# Patient Record
Sex: FEMALE | Race: WHITE | NOT HISPANIC OR LATINO
[De-identification: names, ages, dates, MRNs, and addresses within clinical notes are randomized per-mention and may not be internally consistent; named-entity substitution may affect disease eponyms.]

---

## 2018-07-15 PROBLEM — Z00.00 ENCOUNTER FOR PREVENTIVE HEALTH EXAMINATION: Status: ACTIVE | Noted: 2018-07-15

## 2018-08-14 ENCOUNTER — APPOINTMENT (OUTPATIENT)
Dept: OBGYN | Facility: CLINIC | Age: 58
End: 2018-08-14
Payer: COMMERCIAL

## 2018-08-14 VITALS
HEIGHT: 64 IN | HEART RATE: 73 BPM | DIASTOLIC BLOOD PRESSURE: 96 MMHG | WEIGHT: 159 LBS | BODY MASS INDEX: 27.14 KG/M2 | SYSTOLIC BLOOD PRESSURE: 165 MMHG

## 2018-08-14 DIAGNOSIS — Z82.49 FAMILY HISTORY OF ISCHEMIC HEART DISEASE AND OTHER DISEASES OF THE CIRCULATORY SYSTEM: ICD-10-CM

## 2018-08-14 DIAGNOSIS — Z80.3 FAMILY HISTORY OF MALIGNANT NEOPLASM OF BREAST: ICD-10-CM

## 2018-08-14 DIAGNOSIS — Z84.1 FAMILY HISTORY OF DISORDERS OF KIDNEY AND URETER: ICD-10-CM

## 2018-08-14 DIAGNOSIS — Z78.9 OTHER SPECIFIED HEALTH STATUS: ICD-10-CM

## 2018-08-14 DIAGNOSIS — Z86.2 PERSONAL HISTORY OF DISEASES OF THE BLOOD AND BLOOD-FORMING ORGANS AND CERTAIN DISORDERS INVOLVING THE IMMUNE MECHANISM: ICD-10-CM

## 2018-08-14 DIAGNOSIS — Z83.3 FAMILY HISTORY OF DIABETES MELLITUS: ICD-10-CM

## 2018-08-14 PROCEDURE — 99205 OFFICE O/P NEW HI 60 MIN: CPT

## 2018-08-14 RX ORDER — METRONIDAZOLE 7.5 MG/G
0.75 GEL VAGINAL
Qty: 1 | Refills: 1 | Status: ACTIVE | COMMUNITY
Start: 2018-08-14 | End: 1900-01-01

## 2018-09-07 ENCOUNTER — OUTPATIENT (OUTPATIENT)
Dept: OUTPATIENT SERVICES | Facility: HOSPITAL | Age: 58
LOS: 1 days | End: 2018-09-07
Payer: COMMERCIAL

## 2018-09-07 VITALS
RESPIRATION RATE: 16 BRPM | TEMPERATURE: 98 F | HEIGHT: 64 IN | HEART RATE: 78 BPM | DIASTOLIC BLOOD PRESSURE: 72 MMHG | SYSTOLIC BLOOD PRESSURE: 136 MMHG | OXYGEN SATURATION: 97 % | WEIGHT: 158.95 LBS

## 2018-09-07 DIAGNOSIS — N85.2 HYPERTROPHY OF UTERUS: ICD-10-CM

## 2018-09-07 DIAGNOSIS — D25.9 LEIOMYOMA OF UTERUS, UNSPECIFIED: ICD-10-CM

## 2018-09-07 DIAGNOSIS — D50.0 IRON DEFICIENCY ANEMIA SECONDARY TO BLOOD LOSS (CHRONIC): ICD-10-CM

## 2018-09-07 DIAGNOSIS — Z01.818 ENCOUNTER FOR OTHER PREPROCEDURAL EXAMINATION: ICD-10-CM

## 2018-09-07 DIAGNOSIS — N92.0 EXCESSIVE AND FREQUENT MENSTRUATION WITH REGULAR CYCLE: ICD-10-CM

## 2018-09-07 DIAGNOSIS — D64.9 ANEMIA, UNSPECIFIED: ICD-10-CM

## 2018-09-07 LAB
ANION GAP SERPL CALC-SCNC: 13 MMOL/L — SIGNIFICANT CHANGE UP (ref 5–17)
BLD GP AB SCN SERPL QL: NEGATIVE — SIGNIFICANT CHANGE UP
BUN SERPL-MCNC: 13 MG/DL — SIGNIFICANT CHANGE UP (ref 7–23)
CALCIUM SERPL-MCNC: 9.9 MG/DL — SIGNIFICANT CHANGE UP (ref 8.4–10.5)
CHLORIDE SERPL-SCNC: 100 MMOL/L — SIGNIFICANT CHANGE UP (ref 96–108)
CO2 SERPL-SCNC: 24 MMOL/L — SIGNIFICANT CHANGE UP (ref 22–31)
CREAT SERPL-MCNC: 0.73 MG/DL — SIGNIFICANT CHANGE UP (ref 0.5–1.3)
GLUCOSE SERPL-MCNC: 83 MG/DL — SIGNIFICANT CHANGE UP (ref 70–99)
POTASSIUM SERPL-MCNC: 4.3 MMOL/L — SIGNIFICANT CHANGE UP (ref 3.5–5.3)
POTASSIUM SERPL-SCNC: 4.3 MMOL/L — SIGNIFICANT CHANGE UP (ref 3.5–5.3)
RH IG SCN BLD-IMP: POSITIVE — SIGNIFICANT CHANGE UP
SODIUM SERPL-SCNC: 137 MMOL/L — SIGNIFICANT CHANGE UP (ref 135–145)

## 2018-09-07 PROCEDURE — 86901 BLOOD TYPING SEROLOGIC RH(D): CPT

## 2018-09-07 PROCEDURE — G0463: CPT

## 2018-09-07 PROCEDURE — 86850 RBC ANTIBODY SCREEN: CPT

## 2018-09-07 PROCEDURE — 80048 BASIC METABOLIC PNL TOTAL CA: CPT

## 2018-09-07 PROCEDURE — 86900 BLOOD TYPING SEROLOGIC ABO: CPT

## 2018-09-07 PROCEDURE — 83036 HEMOGLOBIN GLYCOSYLATED A1C: CPT

## 2018-09-07 PROCEDURE — 85027 COMPLETE CBC AUTOMATED: CPT

## 2018-09-07 RX ORDER — CEFOTETAN DISODIUM 1 G
2 VIAL (EA) INJECTION ONCE
Qty: 0 | Refills: 0 | Status: DISCONTINUED | OUTPATIENT
Start: 2018-09-14 | End: 2018-09-16

## 2018-09-07 NOTE — H&P PST ADULT - HISTORY OF PRESENT ILLNESS
57 year old female  with anemia, &  h/o uterine fibroid for 5-6 years, now reports having  "large uterine fibroid" , scheduled for surgery on  09/14/14. 57 year old female  with PMH of anemia, &  uterine fibroid for 5-6 years, now reports having lower abdominal fulness &  heavy periods due to "large uterine fibroid" , scheduled for surgery on  09/14/14.

## 2018-09-07 NOTE — H&P PST ADULT - ASSESSMENT
CAPRINI SCORE [CLOT]    AGE RELATED RISK FACTORS                                                       MOBILITY RELATED FACTORS  [ x] Age 41-60 years                                            (1 Point)                  [ ] Bed rest                                                        (1 Point)  [ ] Age: 61-74 years                                           (2 Points)                 [ ] Plaster cast                                                   (2 Points)  [ ] Age= 75 years                                              (3 Points)                 [ ] Bed bound for more than 72 hours                 (2 Points)    DISEASE RELATED RISK FACTORS                                               GENDER SPECIFIC FACTORS  [ ] Edema in the lower extremities                       (1 Point)                  [ ] Pregnancy                                                     (1 Point)  [ ] Varicose veins                                               (1 Point)                  [ ] Post-partum < 6 weeks                                   (1 Point)             [ x] BMI > 25 Kg/m2                                            (1 Point)                  [ ] Hormonal therapy  or oral contraception          (1 Point)                 [ ] Sepsis (in the previous month)                        (1 Point)                  [ ] History of pregnancy complications                 (1 point)  [ ] Pneumonia or serious lung disease                                               [ ] Unexplained or recurrent                     (1 Point)           (in the previous month)                               (1 Point)  [ ] Abnormal pulmonary function test                     (1 Point)                 SURGERY RELATED RISK FACTORS  [ ] Acute myocardial infarction                              (1 Point)                 [ ]  Section                                             (1 Point)  [ ] Congestive heart failure (in the previous month)  (1 Point)               [ ] Minor surgery                                                  (1 Point)   [ ] Inflammatory bowel disease                             (1 Point)                 [ ] Arthroscopic surgery                                        (2 Points)  [ ] Central venous access                                      (2 Points)                [x ] General surgery lasting more than 45 minutes   (2 Points)       [ ] Stroke (in the previous month)                          (5 Points)               [ ] Elective arthroplasty                                         (5 Points)                                                                                                                                               HEMATOLOGY RELATED FACTORS                                                 TRAUMA RELATED RISK FACTORS  [ ] Prior episodes of VTE                                     (3 Points)                 [ ] Fracture of the hip, pelvis, or leg                       (5 Points)  [ ] Positive family history for VTE                         (3 Points)                 [ ] Acute spinal cord injury (in the previous month)  (5 Points)  [ ] Prothrombin 87653 A                                     (3 Points)                 [ ] Paralysis  (less than 1 month)                             (5 Points)  [ ] Factor V Leiden                                             (3 Points)                  [ ] Multiple Trauma within 1 month                        (5 Points)  [ ] Lupus anticoagulants                                     (3 Points)                                                           [ ] Anticardiolipin antibodies                               (3 Points)                                                       [ ] High homocysteine in the blood                      (3 Points)                                             [ ] Other congenital or acquired thrombophilia      (3 Points)                                                [ ] Heparin induced thrombocytopenia                  (3 Points)                                          Total Score [ 4 ]

## 2018-09-08 LAB
HBA1C BLD-MCNC: 5.6 % — SIGNIFICANT CHANGE UP (ref 4–5.6)
HCT VFR BLD CALC: 50.5 % — HIGH (ref 34.5–45)
HGB BLD-MCNC: 17.1 G/DL — HIGH (ref 11.5–15.5)
MCHC RBC-ENTMCNC: 32 PG — SIGNIFICANT CHANGE UP (ref 27–34)
MCHC RBC-ENTMCNC: 33.9 GM/DL — SIGNIFICANT CHANGE UP (ref 32–36)
MCV RBC AUTO: 94.4 FL — SIGNIFICANT CHANGE UP (ref 80–100)
PLATELET # BLD AUTO: 241 K/UL — SIGNIFICANT CHANGE UP (ref 150–400)
RBC # BLD: 5.35 M/UL — HIGH (ref 3.8–5.2)
RBC # FLD: 13.7 % — SIGNIFICANT CHANGE UP (ref 10.3–14.5)
WBC # BLD: 7.34 K/UL — SIGNIFICANT CHANGE UP (ref 3.8–10.5)
WBC # FLD AUTO: 7.34 K/UL — SIGNIFICANT CHANGE UP (ref 3.8–10.5)

## 2018-09-13 ENCOUNTER — TRANSCRIPTION ENCOUNTER (OUTPATIENT)
Age: 58
End: 2018-09-13

## 2018-09-14 ENCOUNTER — TRANSCRIPTION ENCOUNTER (OUTPATIENT)
Age: 58
End: 2018-09-14

## 2018-09-14 ENCOUNTER — RESULT REVIEW (OUTPATIENT)
Age: 58
End: 2018-09-14

## 2018-09-14 ENCOUNTER — APPOINTMENT (OUTPATIENT)
Dept: OBGYN | Facility: HOSPITAL | Age: 58
End: 2018-09-14

## 2018-09-14 ENCOUNTER — INPATIENT (INPATIENT)
Facility: HOSPITAL | Age: 58
LOS: 1 days | Discharge: HOME | DRG: 743 | End: 2018-09-16
Attending: OBSTETRICS & GYNECOLOGY | Admitting: OBSTETRICS & GYNECOLOGY
Payer: COMMERCIAL

## 2018-09-14 VITALS
HEART RATE: 79 BPM | TEMPERATURE: 99 F | RESPIRATION RATE: 20 BRPM | OXYGEN SATURATION: 98 % | SYSTOLIC BLOOD PRESSURE: 144 MMHG | HEIGHT: 64 IN | DIASTOLIC BLOOD PRESSURE: 81 MMHG | WEIGHT: 158.95 LBS

## 2018-09-14 DIAGNOSIS — D50.0 IRON DEFICIENCY ANEMIA SECONDARY TO BLOOD LOSS (CHRONIC): ICD-10-CM

## 2018-09-14 DIAGNOSIS — D25.9 LEIOMYOMA OF UTERUS, UNSPECIFIED: ICD-10-CM

## 2018-09-14 LAB
BASOPHILS # BLD AUTO: 0 K/UL — SIGNIFICANT CHANGE UP (ref 0–0.2)
BASOPHILS NFR BLD AUTO: 0.1 % — SIGNIFICANT CHANGE UP (ref 0–2)
EOSINOPHIL # BLD AUTO: 0 K/UL — SIGNIFICANT CHANGE UP (ref 0–0.5)
EOSINOPHIL NFR BLD AUTO: 0.3 % — SIGNIFICANT CHANGE UP (ref 0–6)
GLUCOSE BLDC GLUCOMTR-MCNC: 84 MG/DL — SIGNIFICANT CHANGE UP (ref 70–99)
HCG UR QL: NEGATIVE — SIGNIFICANT CHANGE UP
HCT VFR BLD CALC: 46 % — HIGH (ref 34.5–45)
HGB BLD-MCNC: 15.3 G/DL — SIGNIFICANT CHANGE UP (ref 11.5–15.5)
LYMPHOCYTES # BLD AUTO: 1.2 K/UL — SIGNIFICANT CHANGE UP (ref 1–3.3)
LYMPHOCYTES # BLD AUTO: 7 % — LOW (ref 13–44)
MCHC RBC-ENTMCNC: 31.2 PG — SIGNIFICANT CHANGE UP (ref 27–34)
MCHC RBC-ENTMCNC: 33.3 GM/DL — SIGNIFICANT CHANGE UP (ref 32–36)
MCV RBC AUTO: 93.6 FL — SIGNIFICANT CHANGE UP (ref 80–100)
MONOCYTES # BLD AUTO: 0.5 K/UL — SIGNIFICANT CHANGE UP (ref 0–0.9)
MONOCYTES NFR BLD AUTO: 2.7 % — SIGNIFICANT CHANGE UP (ref 2–14)
NEUTROPHILS # BLD AUTO: 15.3 K/UL — HIGH (ref 1.8–7.4)
NEUTROPHILS NFR BLD AUTO: 90 % — HIGH (ref 43–77)
PLATELET # BLD AUTO: 238 K/UL — SIGNIFICANT CHANGE UP (ref 150–400)
RBC # BLD: 4.92 M/UL — SIGNIFICANT CHANGE UP (ref 3.8–5.2)
RBC # FLD: 12.1 % — SIGNIFICANT CHANGE UP (ref 10.3–14.5)
RH IG SCN BLD-IMP: POSITIVE — SIGNIFICANT CHANGE UP
WBC # BLD: 17 K/UL — HIGH (ref 3.8–10.5)
WBC # FLD AUTO: 17 K/UL — HIGH (ref 3.8–10.5)

## 2018-09-14 PROCEDURE — 58152 TOTAL HYSTERECTOMY: CPT | Mod: 80

## 2018-09-14 PROCEDURE — 88302 TISSUE EXAM BY PATHOLOGIST: CPT | Mod: 26

## 2018-09-14 PROCEDURE — 58152 TOTAL HYSTERECTOMY: CPT

## 2018-09-14 PROCEDURE — 88307 TISSUE EXAM BY PATHOLOGIST: CPT | Mod: 26

## 2018-09-14 RX ORDER — ACETAMINOPHEN 500 MG
650 TABLET ORAL EVERY 6 HOURS
Qty: 0 | Refills: 0 | Status: DISCONTINUED | OUTPATIENT
Start: 2018-09-14 | End: 2018-09-16

## 2018-09-14 RX ORDER — KETOROLAC TROMETHAMINE 30 MG/ML
30 SYRINGE (ML) INJECTION EVERY 6 HOURS
Qty: 0 | Refills: 0 | Status: DISCONTINUED | OUTPATIENT
Start: 2018-09-14 | End: 2018-09-15

## 2018-09-14 RX ORDER — ACETAMINOPHEN 500 MG
2 TABLET ORAL
Qty: 0 | Refills: 0 | COMMUNITY
Start: 2018-09-14

## 2018-09-14 RX ORDER — HYDROMORPHONE HYDROCHLORIDE 2 MG/ML
0.5 INJECTION INTRAMUSCULAR; INTRAVENOUS; SUBCUTANEOUS
Qty: 0 | Refills: 0 | Status: DISCONTINUED | OUTPATIENT
Start: 2018-09-14 | End: 2018-09-14

## 2018-09-14 RX ORDER — CELECOXIB 200 MG/1
200 CAPSULE ORAL ONCE
Qty: 0 | Refills: 0 | Status: COMPLETED | OUTPATIENT
Start: 2018-09-14 | End: 2018-09-14

## 2018-09-14 RX ORDER — ONDANSETRON 8 MG/1
4 TABLET, FILM COATED ORAL EVERY 4 HOURS
Qty: 0 | Refills: 0 | Status: DISCONTINUED | OUTPATIENT
Start: 2018-09-14 | End: 2018-09-14

## 2018-09-14 RX ORDER — FAMOTIDINE 10 MG/ML
20 INJECTION INTRAVENOUS ONCE
Qty: 0 | Refills: 0 | Status: COMPLETED | OUTPATIENT
Start: 2018-09-14 | End: 2018-09-14

## 2018-09-14 RX ORDER — ACETAMINOPHEN 500 MG
1000 TABLET ORAL ONCE
Qty: 0 | Refills: 0 | Status: COMPLETED | OUTPATIENT
Start: 2018-09-14 | End: 2018-09-14

## 2018-09-14 RX ORDER — IBUPROFEN 200 MG
1 TABLET ORAL
Qty: 0 | Refills: 0 | COMMUNITY

## 2018-09-14 RX ORDER — ONDANSETRON 8 MG/1
4 TABLET, FILM COATED ORAL EVERY 6 HOURS
Qty: 0 | Refills: 0 | Status: DISCONTINUED | OUTPATIENT
Start: 2018-09-14 | End: 2018-09-16

## 2018-09-14 RX ORDER — SODIUM CHLORIDE 9 MG/ML
3 INJECTION INTRAMUSCULAR; INTRAVENOUS; SUBCUTANEOUS EVERY 8 HOURS
Qty: 0 | Refills: 0 | Status: DISCONTINUED | OUTPATIENT
Start: 2018-09-14 | End: 2018-09-14

## 2018-09-14 RX ORDER — HYDROMORPHONE HYDROCHLORIDE 2 MG/ML
30 INJECTION INTRAMUSCULAR; INTRAVENOUS; SUBCUTANEOUS
Qty: 0 | Refills: 0 | Status: DISCONTINUED | OUTPATIENT
Start: 2018-09-14 | End: 2018-09-15

## 2018-09-14 RX ORDER — MORPHINE SULFATE 50 MG/1
0.3 CAPSULE, EXTENDED RELEASE ORAL ONCE
Qty: 0 | Refills: 0 | Status: DISCONTINUED | OUTPATIENT
Start: 2018-09-14 | End: 2018-09-15

## 2018-09-14 RX ORDER — NALOXONE HYDROCHLORIDE 4 MG/.1ML
0.1 SPRAY NASAL
Qty: 0 | Refills: 0 | Status: DISCONTINUED | OUTPATIENT
Start: 2018-09-14 | End: 2018-09-16

## 2018-09-14 RX ORDER — OXYCODONE HYDROCHLORIDE 5 MG/1
1 TABLET ORAL
Qty: 8 | Refills: 0 | OUTPATIENT
Start: 2018-09-14 | End: 2018-09-15

## 2018-09-14 RX ORDER — LIDOCAINE HCL 20 MG/ML
0.2 VIAL (ML) INJECTION ONCE
Qty: 0 | Refills: 0 | Status: COMPLETED | OUTPATIENT
Start: 2018-09-14 | End: 2018-09-14

## 2018-09-14 RX ORDER — HYDROMORPHONE HYDROCHLORIDE 2 MG/ML
0.5 INJECTION INTRAMUSCULAR; INTRAVENOUS; SUBCUTANEOUS
Qty: 0 | Refills: 0 | Status: DISCONTINUED | OUTPATIENT
Start: 2018-09-14 | End: 2018-09-15

## 2018-09-14 RX ORDER — DOCUSATE SODIUM 100 MG
100 CAPSULE ORAL THREE TIMES A DAY
Qty: 0 | Refills: 0 | Status: DISCONTINUED | OUTPATIENT
Start: 2018-09-14 | End: 2018-09-16

## 2018-09-14 RX ORDER — HEPARIN SODIUM 5000 [USP'U]/ML
5000 INJECTION INTRAVENOUS; SUBCUTANEOUS EVERY 12 HOURS
Qty: 0 | Refills: 0 | Status: DISCONTINUED | OUTPATIENT
Start: 2018-09-14 | End: 2018-09-16

## 2018-09-14 RX ORDER — INFLUENZA VIRUS VACCINE 15; 15; 15; 15 UG/.5ML; UG/.5ML; UG/.5ML; UG/.5ML
0.5 SUSPENSION INTRAMUSCULAR ONCE
Qty: 0 | Refills: 0 | Status: DISCONTINUED | OUTPATIENT
Start: 2018-09-14 | End: 2018-09-16

## 2018-09-14 RX ORDER — SODIUM CHLORIDE 9 MG/ML
1000 INJECTION, SOLUTION INTRAVENOUS
Qty: 0 | Refills: 0 | Status: DISCONTINUED | OUTPATIENT
Start: 2018-09-14 | End: 2018-09-15

## 2018-09-14 RX ADMIN — SODIUM CHLORIDE 3 MILLILITER(S): 9 INJECTION INTRAMUSCULAR; INTRAVENOUS; SUBCUTANEOUS at 09:59

## 2018-09-14 RX ADMIN — Medication 1000 MILLIGRAM(S): at 09:57

## 2018-09-14 RX ADMIN — SODIUM CHLORIDE 125 MILLILITER(S): 9 INJECTION, SOLUTION INTRAVENOUS at 15:57

## 2018-09-14 RX ADMIN — FAMOTIDINE 20 MILLIGRAM(S): 10 INJECTION INTRAVENOUS at 09:58

## 2018-09-14 RX ADMIN — Medication 1000 MILLIGRAM(S): at 16:00

## 2018-09-14 RX ADMIN — HEPARIN SODIUM 5000 UNIT(S): 5000 INJECTION INTRAVENOUS; SUBCUTANEOUS at 22:40

## 2018-09-14 RX ADMIN — ONDANSETRON 4 MILLIGRAM(S): 8 TABLET, FILM COATED ORAL at 15:30

## 2018-09-14 RX ADMIN — HYDROMORPHONE HYDROCHLORIDE 30 MILLILITER(S): 2 INJECTION INTRAMUSCULAR; INTRAVENOUS; SUBCUTANEOUS at 18:47

## 2018-09-14 RX ADMIN — HYDROMORPHONE HYDROCHLORIDE 30 MILLILITER(S): 2 INJECTION INTRAMUSCULAR; INTRAVENOUS; SUBCUTANEOUS at 20:02

## 2018-09-14 RX ADMIN — Medication 0.2 MILLILITER(S): at 09:59

## 2018-09-14 RX ADMIN — CELECOXIB 200 MILLIGRAM(S): 200 CAPSULE ORAL at 09:58

## 2018-09-14 RX ADMIN — HYDROMORPHONE HYDROCHLORIDE 30 MILLILITER(S): 2 INJECTION INTRAMUSCULAR; INTRAVENOUS; SUBCUTANEOUS at 16:12

## 2018-09-14 RX ADMIN — Medication 400 MILLIGRAM(S): at 15:42

## 2018-09-14 RX ADMIN — SODIUM CHLORIDE 125 MILLILITER(S): 9 INJECTION, SOLUTION INTRAVENOUS at 22:40

## 2018-09-14 NOTE — BRIEF OPERATIVE NOTE - PROCEDURE
<<-----Click on this checkbox to enter Procedure Total abdominal hysterectomy with bilateral salpingectomy  09/14/2018    Active  [id number]

## 2018-09-14 NOTE — DISCHARGE NOTE ADULT - HOSPITAL COURSE
Pt is a 56 y/o F admitted on 9/14/18 for a scheduled surgery who underwent an abdominal hysterectomy, bilateral salpingectomy with Dr. Yvon Sheffield without complications. See operative note for full details. Patient had uncomplicated hospital course, and met all post operative milestones. At time of discharge patient was voiding , tolerating regular diet, ambulating, stable vital signs, afebrile and pain controlled on PO medications. Pt will have close interval follow up with Dr. Yvon Sheffield Pt is a 56 y/o F admitted on 9/14/18 for a scheduled surgery who underwent an abdominal hysterectomy, bilateral salpingectomy with Dr. Yvon Sheffield without complications. See operative note for full details. Patient had uncomplicated hospital course, and met all post operative milestones. At time of discharge patient was voiding , tolerating regular diet, ambulating, stable vital signs, afebrile and pain controlled on PO medications. Pt will have close interval follow up with Dr. Yvon Sheffield                14.2   11.2  )-----------( 220      ( 09-15 @ 07:07 )             42.0                15.3   17.0  )-----------( 238      ( 09-14 @ 15:27 )             46.0

## 2018-09-14 NOTE — DISCHARGE NOTE ADULT - CONDITIONS AT DISCHARGE
pt A&O x's 4, vitals stable   ambulating independently , tolerating diet, no c/o pain this shift  and voiding adequate urine  lower traverse abdominal incision closed with Dermabond well approximated drake cdi. ivl x's 2 dc'd  no signs or symptoms of infection or infiltration

## 2018-09-14 NOTE — DISCHARGE NOTE ADULT - CARE PROVIDER_API CALL
Yvon Sheffield (MD), Obstetrics and Gynecology  [facility] [facility]  [address] [place] [address]  Phone: [phone]  Fax: [phone]

## 2018-09-14 NOTE — DISCHARGE NOTE ADULT - CARE PLAN
Principal Discharge DX:	Fibroid uterus  Goal:	recovery  Assessment and plan of treatment:	nothing in vagina (sex, tampons, douching) no heavy lifting (>10 lbs) for 6 weeks. Please return to ER or call your doctors office if pain is worsening, you are unable to keep down food or drink, fever >101, heavy vaginal bleeding. You are able to take a shower regularly.

## 2018-09-14 NOTE — PROGRESS NOTE ADULT - SUBJECTIVE AND OBJECTIVE BOX
POST-OP CHECK    Allergies: No Known Allergies    S: Pt awake and alert resting comfortably in bed. Pain controlled. Pt reports nausea moderately improved w/ antiemetics. Denies vomiting, SOB, CP, palpitations. Not OOB yet.    O:   T(C): 36.2 (09-14-18 @ 17:15), Max: 36.3 (09-14-18 @ 14:45)  HR: 70 (09-14-18 @ 17:15) (54 - 70)  BP: 115/63 (09-14-18 @ 17:15) (105/63 - 124/61)  RR: 16 (09-14-18 @ 17:15) (16 - 16)  SpO2: 99% (09-14-18 @ 17:15) (96% - 99%)  Wt(kg): --  I&O's Summary    14 Sep 2018 07:01  -  14 Sep 2018 17:18  --------------------------------------------------------  IN: 375 mL / OUT: 175 mL / NET: 200 mL        CV: S1S2, RRR  Lungs: CTA B/L  Abd: soft, appropriately tender, occasional BS x 4 quadrants  Inc: Clean/dry/intact  Ext: PAS in place, Neg Homans B/L               15.3   17.0  )-----------( 238      ( 09-14 @ 15:27 )             46.0         Brief Op Note  Procedure:  Total abdominal hysterectomy with bilateral salpingectomy  09/14/2018    Active  [id number].      Operative Findings:  · Operative Findings	Large 25wk size uterus, normal tubes and ovaries bilaterally	    Specimens/Blood Loss/IV/Output/Protocol/VTE:   Specimens/Blood Loss/IV/Output/Protocol/VTE:  · Specimens	cervix, uterus, bilateral tubes	  · Estimated Blood Loss	200 milliLiter(s)	  · IV Infusions - Crystalloids	2000	  · Urine Output	120 milliLiter(s)

## 2018-09-14 NOTE — DISCHARGE NOTE ADULT - PATIENT PORTAL LINK FT
You can access the [email or web address] Patient Portal, offered by [facility], by registering with the following website: [email or web address]

## 2018-09-14 NOTE — PROGRESS NOTE ADULT - PROBLEM SELECTOR PLAN 1
1. Neuro: Analgesia PRN. acetaminophen   Tablet .. 650 milliGRAM(s) Oral every 6 hours PRN  HYDROmorphone  Injectable 0.5 milliGRAM(s) IV Push every 10 minutes PRN  HYDROmorphone PCA (1 mG/mL) 30 milliLiter(s) PCA Continuous PCA Continuous  HYDROmorphone PCA (1 mG/mL) Rescue Clinician Bolus 0.5 milliGRAM(s) IV Push every 15 minutes PRN  ketorolac   Injectable 30 milliGRAM(s) IV Push every 6 hours PRN  morphine PF Spinal 0.3 milliGRAM(s) IntraThecal. once  ondansetron Injectable 4 milliGRAM(s) IV Push every 6 hours PRN  ondansetron Injectable 4 milliGRAM(s) IV Push every 4 hours PRN  2. Card: stat CBC WNL Monitor VS. CBC in AM.  no active issues  3. Pulm: Incentive spirometer use. no active issues  4. GI: Advance to regular diet as tolerated. Anti-emetics PRN.  5. : UO adequate Lozano to gravity.   6. Electrolytes: LR@125cc/hr.   7. DVT ppx w/ PAS while in bed. Early ambulation, initially with assistance then as tolerated.  8. Discharge from PACU when criteria met.   d/w GYN team.

## 2018-09-15 DIAGNOSIS — Z98.890 OTHER SPECIFIED POSTPROCEDURAL STATES: ICD-10-CM

## 2018-09-15 LAB
HCT VFR BLD CALC: 42 % — SIGNIFICANT CHANGE UP (ref 34.5–45)
HGB BLD-MCNC: 14.2 G/DL — SIGNIFICANT CHANGE UP (ref 11.5–15.5)
MCHC RBC-ENTMCNC: 31.7 PG — SIGNIFICANT CHANGE UP (ref 27–34)
MCHC RBC-ENTMCNC: 33.8 GM/DL — SIGNIFICANT CHANGE UP (ref 32–36)
MCV RBC AUTO: 93.9 FL — SIGNIFICANT CHANGE UP (ref 80–100)
PLATELET # BLD AUTO: 220 K/UL — SIGNIFICANT CHANGE UP (ref 150–400)
RBC # BLD: 4.47 M/UL — SIGNIFICANT CHANGE UP (ref 3.8–5.2)
RBC # FLD: 12.1 % — SIGNIFICANT CHANGE UP (ref 10.3–14.5)
WBC # BLD: 11.2 K/UL — HIGH (ref 3.8–10.5)
WBC # FLD AUTO: 11.2 K/UL — HIGH (ref 3.8–10.5)

## 2018-09-15 PROCEDURE — 99024 POSTOP FOLLOW-UP VISIT: CPT

## 2018-09-15 RX ORDER — IBUPROFEN 200 MG
600 TABLET ORAL EVERY 6 HOURS
Qty: 0 | Refills: 0 | Status: DISCONTINUED | OUTPATIENT
Start: 2018-09-15 | End: 2018-09-16

## 2018-09-15 RX ORDER — OXYCODONE HYDROCHLORIDE 5 MG/1
5 TABLET ORAL EVERY 4 HOURS
Qty: 0 | Refills: 0 | Status: DISCONTINUED | OUTPATIENT
Start: 2018-09-15 | End: 2018-09-16

## 2018-09-15 RX ORDER — BENZOCAINE AND MENTHOL 5; 1 G/100ML; G/100ML
1 LIQUID ORAL
Qty: 0 | Refills: 0 | Status: DISCONTINUED | OUTPATIENT
Start: 2018-09-15 | End: 2018-09-16

## 2018-09-15 RX ORDER — SODIUM CHLORIDE 9 MG/ML
3 INJECTION INTRAMUSCULAR; INTRAVENOUS; SUBCUTANEOUS EVERY 8 HOURS
Qty: 0 | Refills: 0 | Status: DISCONTINUED | OUTPATIENT
Start: 2018-09-15 | End: 2018-09-16

## 2018-09-15 RX ADMIN — Medication 650 MILLIGRAM(S): at 22:41

## 2018-09-15 RX ADMIN — Medication 650 MILLIGRAM(S): at 09:32

## 2018-09-15 RX ADMIN — Medication 600 MILLIGRAM(S): at 23:15

## 2018-09-15 RX ADMIN — Medication 650 MILLIGRAM(S): at 23:15

## 2018-09-15 RX ADMIN — Medication 600 MILLIGRAM(S): at 09:31

## 2018-09-15 RX ADMIN — Medication 600 MILLIGRAM(S): at 22:42

## 2018-09-15 RX ADMIN — Medication 650 MILLIGRAM(S): at 17:00

## 2018-09-15 RX ADMIN — Medication 650 MILLIGRAM(S): at 16:06

## 2018-09-15 RX ADMIN — Medication 600 MILLIGRAM(S): at 17:00

## 2018-09-15 RX ADMIN — Medication 650 MILLIGRAM(S): at 10:00

## 2018-09-15 RX ADMIN — Medication 600 MILLIGRAM(S): at 10:00

## 2018-09-15 RX ADMIN — Medication 600 MILLIGRAM(S): at 16:08

## 2018-09-15 RX ADMIN — HYDROMORPHONE HYDROCHLORIDE 30 MILLILITER(S): 2 INJECTION INTRAMUSCULAR; INTRAVENOUS; SUBCUTANEOUS at 07:37

## 2018-09-15 RX ADMIN — HEPARIN SODIUM 5000 UNIT(S): 5000 INJECTION INTRAVENOUS; SUBCUTANEOUS at 16:06

## 2018-09-15 RX ADMIN — SODIUM CHLORIDE 3 MILLILITER(S): 9 INJECTION INTRAMUSCULAR; INTRAVENOUS; SUBCUTANEOUS at 21:20

## 2018-09-15 NOTE — PROGRESS NOTE ADULT - SUBJECTIVE AND OBJECTIVE BOX
Day __ of Anesthesia Pain Management Service    SUBJECTIVE: Patient is doing well with IV PCA    Pain Scale Score:	[X] Refer to charted pain scores    THERAPY:    [ ] IV PCA Morphine		[ ] 5 mg/mL	[ ] 1 mg/mL  [X] IV PCA Hydromorphone	[ ] 5 mg/mL	[X] 1 mg/mL  [ ] IV PCA Fentanyl		[ ] 50 micrograms/mL    Demand dose: 0.2 mg     Lockout: 6 minutes   Continuous Rate: 0 mg/hr  4 Hour Limit: 4 mg    MEDICATIONS  (STANDING):  cefoTEtan  IVPB 2 Gram(s) IV Intermittent once  heparin  Injectable 5000 Unit(s) SubCutaneous every 12 hours  influenza   Vaccine 0.5 milliLiter(s) IntraMuscular once  lactated ringers. 1000 milliLiter(s) (125 mL/Hr) IV Continuous <Continuous>  morphine PF Spinal 0.3 milliGRAM(s) IntraThecal. once    MEDICATIONS  (PRN):  acetaminophen   Tablet .. 650 milliGRAM(s) Oral every 6 hours PRN Mild Pain (1 - 3)  docusate sodium 100 milliGRAM(s) Oral three times a day PRN Stool Softening  ibuprofen  Tablet. 600 milliGRAM(s) Oral every 6 hours PRN Mild Pain (1 - 3), Moderate Pain (4 - 6)  naloxone Injectable 0.1 milliGRAM(s) IV Push every 3 minutes PRN For ANY of the following changes in patient status:  A. RR LESS THAN 10 breaths per minute, B. Oxygen saturation LESS THAN 90%, C. Sedation score of 6  ondansetron Injectable 4 milliGRAM(s) IV Push every 6 hours PRN Nausea  oxyCODONE    IR 5 milliGRAM(s) Oral every 4 hours PRN Severe Pain (7 - 10)      OBJECTIVE:    Sedation Score:	[ X] Alert	[ ] Drowsy 	[ ] Arousable	[ ] Asleep	[ ] Unresponsive    Side Effects:	[X ] None	[ ] Nausea	[ ] Vomiting	[ ] Pruritus  		[ ] Other:    Vital Signs Last 24 Hrs  T(C): 37.3 (15 Sep 2018 04:57), Max: 37.3 (14 Sep 2018 23:00)  T(F): 99.1 (15 Sep 2018 04:57), Max: 99.1 (14 Sep 2018 23:00)  HR: 62 (15 Sep 2018 04:57) (54 - 71)  BP: 106/67 (15 Sep 2018 04:57) (105/63 - 138/69)  BP(mean): 71 (14 Sep 2018 17:30) (71 - 85)  RR: 18 (15 Sep 2018 04:57) (15 - 18)  SpO2: 98% (15 Sep 2018 04:57) (96% - 99%)    ASSESSMENT/ PLAN    Therapy to  be:               [ ] Continued   [X] Discontinued   [X] Changed to PRN Analgesics    Documentation and Verification of current medications:   [X] Done	[ ] Not done, not eligible    Comments:

## 2018-09-15 NOTE — PROGRESS NOTE ADULT - SUBJECTIVE AND OBJECTIVE BOX
Day 1 of Anesthesia Pain Management Service    SUBJECTIVE:  Pain Scale Score:          [X] Refer to charted pain scores    THERAPY:    s/p ___mg PF morphine on ___      MEDICATIONS  (STANDING):  cefoTEtan  IVPB 2 Gram(s) IV Intermittent once  heparin  Injectable 5000 Unit(s) SubCutaneous every 12 hours  influenza   Vaccine 0.5 milliLiter(s) IntraMuscular once  lactated ringers. 1000 milliLiter(s) (125 mL/Hr) IV Continuous <Continuous>  morphine PF Spinal 0.3 milliGRAM(s) IntraThecal. once    MEDICATIONS  (PRN):  acetaminophen   Tablet .. 650 milliGRAM(s) Oral every 6 hours PRN Mild Pain (1 - 3)  docusate sodium 100 milliGRAM(s) Oral three times a day PRN Stool Softening  ibuprofen  Tablet. 600 milliGRAM(s) Oral every 6 hours PRN Mild Pain (1 - 3), Moderate Pain (4 - 6)  naloxone Injectable 0.1 milliGRAM(s) IV Push every 3 minutes PRN For ANY of the following changes in patient status:  A. RR LESS THAN 10 breaths per minute, B. Oxygen saturation LESS THAN 90%, C. Sedation score of 6  ondansetron Injectable 4 milliGRAM(s) IV Push every 6 hours PRN Nausea  oxyCODONE    IR 5 milliGRAM(s) Oral every 4 hours PRN Severe Pain (7 - 10)      OBJECTIVE:    Sedation:        	[X] Alert	[ ] Drowsy	[ ] Arousable      [ ] Asleep       [ ] Unresponsive    Side Effects:	[X] None	[ ] Nausea	[ ] Vomiting         [ ] Pruritus  		[ ] Weakness            [ ] Numbness	          [ ] Other:    Vital Signs Last 24 Hrs  T(C): 37.3 (15 Sep 2018 04:57), Max: 37.3 (14 Sep 2018 23:00)  T(F): 99.1 (15 Sep 2018 04:57), Max: 99.1 (14 Sep 2018 23:00)  HR: 62 (15 Sep 2018 04:57) (54 - 71)  BP: 106/67 (15 Sep 2018 04:57) (105/63 - 138/69)  BP(mean): 71 (14 Sep 2018 17:30) (71 - 85)  RR: 18 (15 Sep 2018 04:57) (15 - 18)  SpO2: 98% (15 Sep 2018 04:57) (96% - 99%)    ASSESSMENT/ PLAN  [X] Patient transitioned to prn analgesics  [X] Pain management per primary service, pain service to sign off   [X]Documentation and Verification of current medications

## 2018-09-15 NOTE — PROGRESS NOTE ADULT - PROBLEM SELECTOR PLAN 1
Neuro: transition to PO pain meds  CV: hemodynamically stable, AM labs pending  Pulm: saturating well on room air, encourage incentive spirometry  GI: continue regular diet  : UOP adequate, d/c wilson if AM labs stable  Heme: c/w HSQ and SCDs for DVT ppx  Dispo: continue routine post-op care    Marta Borden MD PGY1 Neuro: transition to PO pain meds  CV: hemodynamically stable, AM labs pending  Pulm: saturating well on room air, encourage incentive spirometry  GI: continue regular diet  : UOP adequate, d/c wilson if AM labs stable  Heme: c/w HSQ and SCDs for DVT ppx  Dispo: continue routine post-op care    D/w attending Dr. Yohannes Borden MD PGY2

## 2018-09-15 NOTE — PROGRESS NOTE ADULT - SUBJECTIVE AND OBJECTIVE BOX
Patient seen and examined at bedside, no acute overnight events. No acute complaints, pain well controlled. Patient is tolerating regular diet. Has not yet passed flatus. Lozano is still in place. Pt has not yet ambulating. Denies CP, SOB, N/V, fevers, chills, or any other concerns.    Vital Signs Last 24 Hours  T(C): 37.3 (09-15-18 @ 04:57), Max: 37.3 (09-14-18 @ 23:00)  HR: 62 (09-15-18 @ 04:57) (54 - 79)  BP: 106/67 (09-15-18 @ 04:57) (105/63 - 144/81)  RR: 18 (09-15-18 @ 04:57) (15 - 20)  SpO2: 98% (09-15-18 @ 04:57) (96% - 99%)    I&O's Summary    14 Sep 2018 07:01  -  15 Sep 2018 07:00  --------------------------------------------------------  IN: 2565 mL / OUT: 1205 mL / NET: 1360 mL        Physical Exam:  General: NAD  CV: NR, RR, S1, S2, no M/R/G  Lungs: CTAB  Abdomen: soft, non-tender, non-distended, +BS  Incision: CDI pfannenstiel incision with surrounding bruising  Ext: no pain or swelling    Labs:             15.3   17.0<H> )-----------( 238      ( 09-14 @ 15:27 )             46.0<H>        MEDICATIONS  (STANDING):  cefoTEtan  IVPB 2 Gram(s) IV Intermittent once  heparin  Injectable 5000 Unit(s) SubCutaneous every 12 hours  HYDROmorphone PCA (1 mG/mL) 30 milliLiter(s) PCA Continuous PCA Continuous  influenza   Vaccine 0.5 milliLiter(s) IntraMuscular once  lactated ringers. 1000 milliLiter(s) (125 mL/Hr) IV Continuous <Continuous>  morphine PF Spinal 0.3 milliGRAM(s) IntraThecal. once    MEDICATIONS  (PRN):  acetaminophen   Tablet .. 650 milliGRAM(s) Oral every 6 hours PRN Mild Pain (1 - 3)  docusate sodium 100 milliGRAM(s) Oral three times a day PRN Stool Softening  HYDROmorphone PCA (1 mG/mL) Rescue Clinician Bolus 0.5 milliGRAM(s) IV Push every 15 minutes PRN for Pain Scale GREATER THAN 6  ketorolac   Injectable 30 milliGRAM(s) IV Push every 6 hours PRN Mild Pain (1 - 3)  naloxone Injectable 0.1 milliGRAM(s) IV Push every 3 minutes PRN For ANY of the following changes in patient status:  A. RR LESS THAN 10 breaths per minute, B. Oxygen saturation LESS THAN 90%, C. Sedation score of 6  ondansetron Injectable 4 milliGRAM(s) IV Push every 6 hours PRN Nausea

## 2018-09-16 VITALS
OXYGEN SATURATION: 98 % | TEMPERATURE: 98 F | HEART RATE: 88 BPM | RESPIRATION RATE: 18 BRPM | DIASTOLIC BLOOD PRESSURE: 84 MMHG | SYSTOLIC BLOOD PRESSURE: 129 MMHG

## 2018-09-16 PROCEDURE — 82962 GLUCOSE BLOOD TEST: CPT

## 2018-09-16 PROCEDURE — 88307 TISSUE EXAM BY PATHOLOGIST: CPT

## 2018-09-16 PROCEDURE — 85027 COMPLETE CBC AUTOMATED: CPT

## 2018-09-16 PROCEDURE — 86900 BLOOD TYPING SEROLOGIC ABO: CPT

## 2018-09-16 PROCEDURE — 88302 TISSUE EXAM BY PATHOLOGIST: CPT

## 2018-09-16 PROCEDURE — 81025 URINE PREGNANCY TEST: CPT

## 2018-09-16 PROCEDURE — C1889: CPT

## 2018-09-16 PROCEDURE — 86901 BLOOD TYPING SEROLOGIC RH(D): CPT

## 2018-09-16 RX ADMIN — SODIUM CHLORIDE 3 MILLILITER(S): 9 INJECTION INTRAMUSCULAR; INTRAVENOUS; SUBCUTANEOUS at 05:33

## 2018-09-16 RX ADMIN — BENZOCAINE AND MENTHOL 1 LOZENGE: 5; 1 LIQUID ORAL at 05:29

## 2018-09-16 RX ADMIN — Medication 600 MILLIGRAM(S): at 05:29

## 2018-09-16 RX ADMIN — Medication 650 MILLIGRAM(S): at 05:29

## 2018-09-16 RX ADMIN — BENZOCAINE AND MENTHOL 1 LOZENGE: 5; 1 LIQUID ORAL at 03:15

## 2018-09-16 NOTE — PROGRESS NOTE ADULT - ATTENDING COMMENTS
Patient seen and examined  Agree with Above assessment and plan  pt stable  d/c home
Gyn attg:    labs reviewed patient doing well continue routine postoperative care

## 2018-09-16 NOTE — PROGRESS NOTE ADULT - SUBJECTIVE AND OBJECTIVE BOX
Patient seen and examined at bedside, no acute overnight events. No acute complaints, pain well controlled. Patient is ambulating, passing flatus, voiding spontaneously, and tolerating regular diet. Pt is yet to have a BM. Denies CP, SOB, N/V, fevers, chills, or any other concerns.    Vital Signs Last 24 Hours  T(C): 36.7 (09-16-18 @ 05:23), Max: 37.4 (09-15-18 @ 11:28)  HR: 75 (09-16-18 @ 05:23) (63 - 88)  BP: 120/78 (09-16-18 @ 05:23) (97/65 - 120/78)  RR: 18 (09-16-18 @ 05:23) (17 - 18)  SpO2: 96% (09-16-18 @ 05:23) (95% - 97%)    I&O's Summary    14 Sep 2018 07:01  -  15 Sep 2018 07:00  --------------------------------------------------------  IN: 2565 mL / OUT: 1205 mL / NET: 1360 mL    15 Sep 2018 07:01  -  16 Sep 2018 06:47  --------------------------------------------------------  IN: 1410 mL / OUT: 2200 mL / NET: -790 mL        Physical Exam:  General: NAD  CV: NR, RR, S1, S2, no M/R/G  Lungs: CTAB  Abdomen: soft, non-tender, non-distended, +BS  Incision: pfannenstiel incision CDI, dermabond intact  Ext: no pain or swelling    Labs:             14.2   11.2<H> )-----------( 220      ( 09-15 @ 07:07 )             42.0                15.3   17.0<H> )-----------( 238      ( 09-14 @ 15:27 )             46.0<H>        MEDICATIONS  (STANDING):  benzocaine 15 mG/menthol 3.6 mG Lozenge 1 Lozenge Oral every 3 hours  cefoTEtan  IVPB 2 Gram(s) IV Intermittent once  heparin  Injectable 5000 Unit(s) SubCutaneous every 12 hours  influenza   Vaccine 0.5 milliLiter(s) IntraMuscular once  sodium chloride 0.9% lock flush 3 milliLiter(s) IV Push every 8 hours    MEDICATIONS  (PRN):  acetaminophen   Tablet .. 650 milliGRAM(s) Oral every 6 hours PRN Mild Pain (1 - 3)  docusate sodium 100 milliGRAM(s) Oral three times a day PRN Stool Softening  ibuprofen  Tablet. 600 milliGRAM(s) Oral every 6 hours PRN Mild Pain (1 - 3), Moderate Pain (4 - 6)  naloxone Injectable 0.1 milliGRAM(s) IV Push every 3 minutes PRN For ANY of the following changes in patient status:  A. RR LESS THAN 10 breaths per minute, B. Oxygen saturation LESS THAN 90%, C. Sedation score of 6  ondansetron Injectable 4 milliGRAM(s) IV Push every 6 hours PRN Nausea  oxyCODONE    IR 5 milliGRAM(s) Oral every 4 hours PRN Severe Pain (7 - 10)

## 2018-09-16 NOTE — PROGRESS NOTE ADULT - PROBLEM SELECTOR PLAN 1
Neuro: c/w PO pain meds  CV: hemodynamically stable  Pulm: saturating well on room air, encourage incentive spirometry  GI: c/w regular diet  : voiding spontaneously  Heme: c/w HSQ and SCDs for DVT ppx  Dispo: continue routine post-op care    Marta Borden MD PGY2

## 2018-09-27 ENCOUNTER — APPOINTMENT (OUTPATIENT)
Dept: OBGYN | Facility: CLINIC | Age: 58
End: 2018-09-27
Payer: COMMERCIAL

## 2018-09-27 VITALS
WEIGHT: 152 LBS | SYSTOLIC BLOOD PRESSURE: 141 MMHG | DIASTOLIC BLOOD PRESSURE: 82 MMHG | BODY MASS INDEX: 25.95 KG/M2 | TEMPERATURE: 98.5 F | HEIGHT: 64 IN

## 2018-09-27 DIAGNOSIS — N89.8 OTHER SPECIFIED NONINFLAMMATORY DISORDERS OF VAGINA: ICD-10-CM

## 2018-09-27 DIAGNOSIS — Z86.018 PERSONAL HISTORY OF OTHER BENIGN NEOPLASM: ICD-10-CM

## 2018-09-27 DIAGNOSIS — N85.2 HYPERTROPHY OF UTERUS: ICD-10-CM

## 2018-09-27 DIAGNOSIS — Z87.898 PERSONAL HISTORY OF OTHER SPECIFIED CONDITIONS: ICD-10-CM

## 2018-09-27 PROCEDURE — 99024 POSTOP FOLLOW-UP VISIT: CPT

## 2018-09-28 PROBLEM — C44.91 BASAL CELL CARCINOMA OF SKIN, UNSPECIFIED: Chronic | Status: ACTIVE | Noted: 2018-09-07

## 2018-09-28 PROBLEM — D64.9 ANEMIA, UNSPECIFIED: Chronic | Status: ACTIVE | Noted: 2018-09-07

## 2018-09-28 PROBLEM — N92.0 EXCESSIVE AND FREQUENT MENSTRUATION WITH REGULAR CYCLE: Chronic | Status: ACTIVE | Noted: 2018-09-07

## 2018-09-30 PROBLEM — N85.2 ENLARGED UTERUS: Status: RESOLVED | Noted: 2018-08-14 | Resolved: 2018-09-30

## 2018-09-30 PROBLEM — N89.8 VAGINAL ODOR: Status: RESOLVED | Noted: 2018-08-14 | Resolved: 2018-09-30

## 2018-09-30 PROBLEM — Z86.018 HISTORY OF INTRAMURAL LEIOMYOMA OF UTERUS: Status: RESOLVED | Noted: 2018-08-14 | Resolved: 2018-09-30

## 2018-09-30 PROBLEM — Z87.898 HISTORY OF URINARY FREQUENCY: Status: RESOLVED | Noted: 2018-08-14 | Resolved: 2018-09-30

## 2018-10-11 ENCOUNTER — APPOINTMENT (OUTPATIENT)
Dept: OBGYN | Facility: CLINIC | Age: 58
End: 2018-10-11
Payer: COMMERCIAL

## 2018-10-11 VITALS
DIASTOLIC BLOOD PRESSURE: 83 MMHG | BODY MASS INDEX: 25.44 KG/M2 | SYSTOLIC BLOOD PRESSURE: 140 MMHG | HEART RATE: 81 BPM | WEIGHT: 149 LBS | HEIGHT: 64 IN

## 2018-10-11 DIAGNOSIS — Z98.890 OTHER SPECIFIED POSTPROCEDURAL STATES: ICD-10-CM

## 2018-10-11 PROCEDURE — 99024 POSTOP FOLLOW-UP VISIT: CPT

## 2018-10-28 PROBLEM — Z98.890 POSTOPERATIVE STATE: Status: ACTIVE | Noted: 2018-09-30

## 2019-10-06 NOTE — DISCHARGE NOTE ADULT - MEDICATION SUMMARY - MEDICATIONS TO TAKE
I will START or STAY ON the medications listed below when I get home from the hospital:    acetaminophen 325 mg oral tablet  -- 2 tab(s) by mouth every 6 hours, As needed, Mild Pain (1 - 3)  -- Indication: For post-operative pain    ibuprofen 600 mg oral tablet  -- 1 tab(s) by mouth every 6 hours, As Needed  -- Indication: For post-operative pain    oxyCODONE 5 mg oral tablet  -- 1 tab(s) by mouth every 6 hours, As Needed -for rash - for severe pain MDD:4 tabs  -- Caution federal law prohibits the transfer of this drug to any person other  than the person for whom it was prescribed.  It is very important that you take or use this exactly as directed.  Do not skip doses or discontinue unless directed by your doctor.  May cause drowsiness.  Alcohol may intensify this effect.  Use care when operating dangerous machinery.  This prescription cannot be refilled.  Using more of this medication than prescribed may cause serious breathing problems.    -- Indication: For severe pain WDL

## 2021-07-20 NOTE — PROGRESS NOTE ADULT - PROBLEM/PLAN-1
Thyroid studies now wnl.  Normal vitamin levels except low vitamin D, would recommend taking 2000u daily.   Continue with bipolar depression medications and fu with  (referral placed at last visit).  DISPLAY PLAN FREE TEXT

## 2021-08-31 NOTE — DISCHARGE NOTE ADULT - PROVIDER RX CONTACT NUMBER
Physical Therapy  Patient not seen in therapy.     Unavailable due to request no therapy today.      Re-attempt plan: tomorrow    Pt states that her MD told her not to get up. Will contact Dr. Saenz for clarification.                                   [phone]

## 2023-12-25 ENCOUNTER — NON-APPOINTMENT (OUTPATIENT)
Age: 63
End: 2023-12-25

## 2024-12-14 ENCOUNTER — NON-APPOINTMENT (OUTPATIENT)
Age: 64
End: 2024-12-14